# Patient Record
(demographics unavailable — no encounter records)

---

## 2024-11-01 NOTE — PHYSICAL EXAM
[Mucoid Discharge] : mucoid discharge [Hypertrophied Nasal Mucosa] : hypertrophied nasal mucosa [NL] : moves all extremities x4, warm, well perfused x4 [Face] : face [Neck] : neck [Trunk] : trunk [Arms] : arms [Legs] : legs [Clear] : left tympanic membrane not clear [Erythema] : no erythema [de-identified] : erythematous, urticarial

## 2024-11-01 NOTE — PHYSICAL EXAM
[Mucoid Discharge] : mucoid discharge [Hypertrophied Nasal Mucosa] : hypertrophied nasal mucosa [NL] : moves all extremities x4, warm, well perfused x4 [Face] : face [Neck] : neck [Trunk] : trunk [Arms] : arms [Legs] : legs [Clear] : left tympanic membrane not clear [Erythema] : no erythema [de-identified] : erythematous, urticarial

## 2024-11-01 NOTE — HISTORY OF PRESENT ILLNESS
[___ Day(s)] : [unfilled] day(s) [de-identified] : Earache, rash [FreeTextEntry6] : Two day history of left ear pain. No fever.  Today, finished a seven day course of amoxicillin for pneumonia, diagnosed at an urgent care center on 10/20/24.  Yesterday he developed a generalized itchy rash.

## 2024-11-01 NOTE — PHYSICAL EXAM
[Mucoid Discharge] : mucoid discharge [Hypertrophied Nasal Mucosa] : hypertrophied nasal mucosa [NL] : moves all extremities x4, warm, well perfused x4 [Face] : face [Neck] : neck [Trunk] : trunk [Arms] : arms [Legs] : legs [Clear] : left tympanic membrane not clear [Erythema] : no erythema [de-identified] : erythematous, urticarial

## 2024-11-01 NOTE — HISTORY OF PRESENT ILLNESS
[___ Day(s)] : [unfilled] day(s) [de-identified] : Earache, rash [FreeTextEntry6] : Two day history of left ear pain. No fever.  Today, finished a seven day course of amoxicillin for pneumonia, diagnosed at an urgent care center on 10/20/24.  Yesterday he developed a generalized itchy rash.

## 2024-11-01 NOTE — DISCUSSION/SUMMARY
[FreeTextEntry1] : Drug rash, amoxicillin   Left Otitis media  Five day course of azithromycin PRN  tylenol or ibuprofen for pain Antihistamine for rash

## 2024-11-01 NOTE — HISTORY OF PRESENT ILLNESS
[___ Day(s)] : [unfilled] day(s) [de-identified] : Earache, rash [FreeTextEntry6] : Two day history of left ear pain. No fever.  Today, finished a seven day course of amoxicillin for pneumonia, diagnosed at an urgent care center on 10/20/24.  Yesterday he developed a generalized itchy rash.

## 2024-11-01 NOTE — PHYSICAL EXAM
[Mucoid Discharge] : mucoid discharge [Hypertrophied Nasal Mucosa] : hypertrophied nasal mucosa [NL] : moves all extremities x4, warm, well perfused x4 [Face] : face [Neck] : neck [Trunk] : trunk [Arms] : arms [Legs] : legs [Clear] : left tympanic membrane not clear [Erythema] : no erythema [de-identified] : erythematous, urticarial

## 2024-11-01 NOTE — PHYSICAL EXAM
[Mucoid Discharge] : mucoid discharge [Hypertrophied Nasal Mucosa] : hypertrophied nasal mucosa [NL] : moves all extremities x4, warm, well perfused x4 [Face] : face [Neck] : neck [Trunk] : trunk [Arms] : arms [Legs] : legs [Clear] : left tympanic membrane not clear [Erythema] : no erythema [de-identified] : erythematous, urticarial

## 2024-11-01 NOTE — HISTORY OF PRESENT ILLNESS
[___ Day(s)] : [unfilled] day(s) [de-identified] : Earache, rash [FreeTextEntry6] : Two day history of left ear pain. No fever.  Today, finished a seven day course of amoxicillin for pneumonia, diagnosed at an urgent care center on 10/20/24.  Yesterday he developed a generalized itchy rash.

## 2024-11-01 NOTE — HISTORY OF PRESENT ILLNESS
[___ Day(s)] : [unfilled] day(s) [de-identified] : Earache, rash [FreeTextEntry6] : Two day history of left ear pain. No fever.  Today, finished a seven day course of amoxicillin for pneumonia, diagnosed at an urgent care center on 10/20/24.  Yesterday he developed a generalized itchy rash.

## 2024-11-06 NOTE — HISTORY OF PRESENT ILLNESS
[EENT/Resp Symptoms] : EENT/RESPIRATORY SYMPTOMS [Cough] : cough [Wheezing] : wheezing [___ Day(s)] : [unfilled] day(s) [FreeTextEntry6] : Was seen at  on 10/20/24 for cough and fever--. Diagnosed with pneumonia, treated with Amoxicillin, Albuterol MDI and Benzonatate 100mg tabs. Returned to office on 10/29 due to ear pain and drug induced rash (Amoxicillin) Treated with Azithromycin for 5 days for LOM.  Currently cough started again 2-3 night ago. No fever. Feels out of breath, wheezing with deep inhalations. Wheezing started last night, got worst this morning Cough until vomit x2. Started using Albuterol MDI since last night. Plays volleyball-has playoffs this weekend.

## 2024-11-06 NOTE — PHYSICAL EXAM
[Wheezing] : wheezing [NL] : warm, clear [Enlarged] : enlarged [Anterior Cervical] : anterior cervical [FreeTextEntry7] : harsh breath sounds, decreased air entry at bases and expiratory wheezing

## 2024-11-06 NOTE — DISCUSSION/SUMMARY
[FreeTextEntry1] : 17 yo with ongoing bronchopneumonia and wheezing  Chest X-ray ordered Pt completed Azithromycin for 5 days Continue with Albuterol HFA 2 inhal every 4-6 hrs as needed OTC cough and cold meds as needed Tylenol can be used every 4 hours as needed for fever or pain and Motrin can be used every 6 hours as needed for fever or pain. Increase fluids and lots of rest, limit sports until feeling better Although most symptoms ease in a few days or weeks, the feeling of tiredness can persist for a month or more. Red Flags for respiratory distressed discussed with patient and mother.  Parental questions and concerns addressed. Call the office or return if symptoms persist or worsen.

## 2024-11-11 NOTE — PHYSICAL EXAM
[Symmetric Chest Wall] : symmetric chest wall [Tenderness With Palpation of Chest Wall] : tenderness with palpation of chest wall [Wheezing] : wheezing [Rales] : no rales [Crackles] : no crackles [Tachypnea] : no tachypnea [Rhonchi] : rhonchi [Subcostal Retractions] : no subcostal retractions [Suprasternal Retractions] : no suprasternal retractions [NL] : warm, clear

## 2024-11-11 NOTE — REVIEW OF SYSTEMS
[Diaphoresis] : not diaphoretic [Edema] : no edema [Chest Pain] : chest pain [Intolerance to Exercise] : intolerance to exercise [Tachypnea] : not tachypneic [Wheezing] : wheezing [Cough] : cough [Congestion] : congestion [Shortness of Breath] : shortness of breath [Negative] : Genitourinary [FreeTextEntry2] : tachycardia

## 2024-11-11 NOTE — HISTORY OF PRESENT ILLNESS
[de-identified] : Follow up to brief hospitalization for asthma and pneumonia [FreeTextEntry6] : this patient was treated for bronchopneumonia one week ago and subsequently developed shortness of breath and chest pain despite the completion of a five day course of azithromycin.  Hosptialized on 11/7/24 for less than 24 hours, he had wheezing and tachycardia and was released on Augmentin for completion of a seven day course. He feels a little better today but continues to have occasional shortness of breath and chest pain. He has been using his albuterol inhaler irregularly

## 2024-11-11 NOTE — DISCUSSION/SUMMARY
[FreeTextEntry1] : Persistent pneumonia Asthma with exacerbation  Continue therapy with Augmentin to completion of seven day course Albuterol inhalations, three times daily follow up in 24 hours